# Patient Record
Sex: MALE
[De-identification: names, ages, dates, MRNs, and addresses within clinical notes are randomized per-mention and may not be internally consistent; named-entity substitution may affect disease eponyms.]

---

## 2019-03-11 ENCOUNTER — HOSPITAL ENCOUNTER (EMERGENCY)
Dept: HOSPITAL 25 - UCCORT | Age: 49
Discharge: HOME | End: 2019-03-11
Payer: COMMERCIAL

## 2019-03-11 VITALS — DIASTOLIC BLOOD PRESSURE: 73 MMHG | SYSTOLIC BLOOD PRESSURE: 127 MMHG

## 2019-03-11 DIAGNOSIS — J09.X2: Primary | ICD-10-CM

## 2019-03-11 LAB — FLUAV RNA SPEC QL NAA+PROBE: POSITIVE

## 2019-03-11 PROCEDURE — 99212 OFFICE O/P EST SF 10 MIN: CPT

## 2019-03-11 PROCEDURE — G0463 HOSPITAL OUTPT CLINIC VISIT: HCPCS

## 2019-03-11 NOTE — UC
FLU HPI





- HPI Summary


HPI Summary: 





Pt with 4 days body aches, congestion, sore throat, fatigue.  + sick contact  + 

OTC meds. no cp, sob. mild nausea, no vomiting. Pt requesting flu test - aware 

out of window for tx 


Pt's medications reviewed this visit





- History of Current Complaint


Chief Complaint: UCRespiratory


Stated Complaint: ST,HA,CHEST CONGESTION


Time Seen by Provider: 03/11/19 07:55


Hx Obtained From: Patient, Family/Caretaker


Onset/Duration: Gradual Onset


Severity Currently: Moderate


Severity Initially: Moderate


Pain Intensity: 0





- Allergy/Home Medications


Allergies/Adverse Reactions: 


 Allergies











Allergy/AdvReac Type Severity Reaction Status Date / Time


 


No Known Allergies Allergy   Verified 03/11/19 07:29











Home Medications: 


 Home Medications





Lisinopril TAB* [Prinivil TAB*] 10 mg PO DAILY 03/11/19 [History Confirmed 03/11 /19]











PMH/Surg Hx/FS Hx/Imm Hx


Previously Healthy: Yes


Other Cancer History: non hodgkins age 26





- Surgical History


Surgical History: None





- Family History


Known Family History: Positive: Non-Contributory


   Negative: Hypertension





- Social History


Occupation: Employed Full-time


Lives: With Family


Alcohol Use: None


Substance Use Type: None


Smoking Status (MU): Never Smoked Tobacco





Review of Systems


All Other Systems Reviewed And Are Negative: Yes


Constitutional: Positive: Fever, Fatigue


ENT: Positive: Nasal Discharge, Sinus Congestion


Respiratory: Positive: Cough


Gastrointestinal: Positive: Nausea





Physical Exam





- Summary


Physical Exam Summary: 





Vital Signs Reviewed: Yes


A+Ox3, no distress, tired appearing


Eyes: Conjunctiva Clear, BABAR. EOM intact and full


ENT: Hearing grossly normal  TM x 2 clear, turbinates inflammed and boggy mmoist

, uvula midline, no exudate, mild erythema


Neck: Positive: Supple


Respiratory: Positive: No respiratory distress, No accessory muscle use + CTA 

throughout  no w/r, intermittent cough


Cardiovascular: RRR  nl s1, s2  no m/r  CBT <2  sec


abd soft + BS nt/nd  no guarding, no distension


Musculoskeletal Exam: SHIPLEY x 4 without difficulty Strength Intact, ROM Intact


Neurological: Positive: Alert,  + sensation throughout


Psychological: Positive: Normal Response To Family


Skin: Positive: no rash, no ecchymosis


Vital Signs: 


 Initial Vital Signs











Temp  100.6 F   03/11/19 07:31


 


Pulse  99   03/11/19 07:31


 


Resp  18   03/11/19 07:31


 


BP  127/73   03/11/19 07:31


 


Pulse Ox  97   03/11/19 07:31














Flu Course/Dx





- Course


Course Of Treatment: Pt presents with progressive fatigue, chills, congestion, 

sob x 4 days.  + influenza.  reviewed wth pt motrin/apap.  secretion 

precaution.  humidified air.  work note





- Differential Dx/Diagnosis


Provider Diagnosis: 


 Influenza A








Discharge





- Sign-Out/Discharge


Documenting (check all that apply): Patient Departure


All imaging exams completed and their final reports reviewed: No Studies





- Discharge Plan


Condition: Stable


Disposition: HOME


Prescriptions: 


Benzonatate CAP* [Tessalon 100 MG CAP*] 100 mg PO TID PRN #21 cap


 PRN Reason: Cough


Fluticasone NASAL SPRAY 50MCG* [Flonase NASAL SPRAY 50MCG*] 2 spray BOTH NARES 

DAILY #1 btl


Patient Education Materials:  Influenza (ED)


Forms:  *Work Release


Referrals: 


Enid Bullock MD [Primary Care Provider] - 


Additional Instructions: 


- Stay well hydrated. Drink plenty of non-alcoholic, non-caffinated beverages.


- Alternate ibuprofen (Advil, Motrin) 600mg and Tylenol every 3 hours for pain 

or fever.  Take with food. Do NOT take for more than 4-5 days. 


- These infections are spread by secretions - do NOT share eating or drinking 

utensils - clean items you share with other people such as cell phones, 

computer mouse, TV remote, computer tablets,etc. Once you start to feel better, 

change your toothbrush and your pillowcase.


- get plenty of restful sleep


- humidify the air in the room where you sleep - boil water, run a hot steam 

shower, vaporizer, cups of water by heat register


- okay to take over the cough medication or the medication prescribed to your 

cough


- Use nasal spray as prescribed


- contact your doctor or return with questions or concerns





- Billing Disposition and Condition


Condition: STABLE


Disposition: Home